# Patient Record
Sex: FEMALE | Race: WHITE | NOT HISPANIC OR LATINO | Employment: OTHER | URBAN - METROPOLITAN AREA
[De-identification: names, ages, dates, MRNs, and addresses within clinical notes are randomized per-mention and may not be internally consistent; named-entity substitution may affect disease eponyms.]

---

## 2017-01-04 ENCOUNTER — OFFICE VISIT (OUTPATIENT)
Dept: MEDICAL GROUP | Facility: MEDICAL CENTER | Age: 62
End: 2017-01-04
Payer: MEDICARE

## 2017-01-04 VITALS
SYSTOLIC BLOOD PRESSURE: 102 MMHG | HEIGHT: 61 IN | WEIGHT: 100 LBS | DIASTOLIC BLOOD PRESSURE: 64 MMHG | OXYGEN SATURATION: 97 % | RESPIRATION RATE: 16 BRPM | TEMPERATURE: 98.1 F | BODY MASS INDEX: 18.88 KG/M2 | HEART RATE: 56 BPM

## 2017-01-04 DIAGNOSIS — Z12.31 VISIT FOR SCREENING MAMMOGRAM: ICD-10-CM

## 2017-01-04 DIAGNOSIS — E78.5 DYSLIPIDEMIA: ICD-10-CM

## 2017-01-04 DIAGNOSIS — E55.9 VITAMIN D DEFICIENCY: ICD-10-CM

## 2017-01-04 DIAGNOSIS — F41.9 ANXIETY: ICD-10-CM

## 2017-01-04 DIAGNOSIS — Z12.11 SCREEN FOR COLON CANCER: ICD-10-CM

## 2017-01-04 DIAGNOSIS — Z00.00 INITIAL MEDICARE ANNUAL WELLNESS VISIT: ICD-10-CM

## 2017-01-04 DIAGNOSIS — Z23 INFLUENZA VACCINE NEEDED: ICD-10-CM

## 2017-01-04 DIAGNOSIS — M19.042 PRIMARY OSTEOARTHRITIS OF BOTH HANDS: ICD-10-CM

## 2017-01-04 DIAGNOSIS — L23.9 ALLERGIC DERMATITIS: ICD-10-CM

## 2017-01-04 DIAGNOSIS — M19.041 PRIMARY OSTEOARTHRITIS OF BOTH HANDS: ICD-10-CM

## 2017-01-04 DIAGNOSIS — M54.6 THORACIC SPINE PAIN: ICD-10-CM

## 2017-01-04 PROCEDURE — 99213 OFFICE O/P EST LOW 20 MIN: CPT | Mod: 25 | Performed by: INTERNAL MEDICINE

## 2017-01-04 PROCEDURE — 90686 IIV4 VACC NO PRSV 0.5 ML IM: CPT | Performed by: INTERNAL MEDICINE

## 2017-01-04 PROCEDURE — G0438 PPPS, INITIAL VISIT: HCPCS | Mod: 25 | Performed by: INTERNAL MEDICINE

## 2017-01-04 PROCEDURE — G0008 ADMIN INFLUENZA VIRUS VAC: HCPCS | Performed by: INTERNAL MEDICINE

## 2017-01-04 RX ORDER — CLOBETASOL PROPIONATE 0.5 MG/G
OINTMENT TOPICAL
Qty: 60 G | Refills: 2 | Status: SHIPPED | OUTPATIENT
Start: 2017-01-04 | End: 2022-12-07

## 2017-01-04 ASSESSMENT — PATIENT HEALTH QUESTIONNAIRE - PHQ9: CLINICAL INTERPRETATION OF PHQ2 SCORE: 0

## 2017-01-04 NOTE — MR AVS SNAPSHOT
"Emelina Baum   2017 8:20 AM   Office Visit   MRN: [id number]    Department:  [address] [place] [address]   Dept Phone:  [phone]    Description:  Female : 1955   Provider:  Naseem Beard M.D.           Reason for Visit     Establish Care     Annual Exam     Medication Refill           Allergies as of 2017     No Known Allergies      You were diagnosed with     Initial Medicare annual wellness visit   [3899620]       Dyslipidemia   [327006]       Anxiety   [808639]       Primary osteoarthritis of both hands   [9942763]       Visit for screening mammogram   [370772]       Thoracic spine pain   [853644]       Screen for colon cancer   [044967]       Vitamin D deficiency   [6517299]       Allergic dermatitis   [958410]       Influenza vaccine needed   [679713]         Vital Signs     Blood Pressure Pulse Temperature Respirations Height Weight    102/64 mmHg 56 36.7 °C (98.1 °F) 16 1.549 m (5' 1\") 45.36 kg (100 lb)    Body Mass Index Oxygen Saturation Smoking Status             18.90 kg/m2 97% Never Smoker          Basic Information     Date Of Birth Sex Race Ethnicity Preferred Language    1955 Female Unknown Non- English      Your appointments     2017  7:40 AM   Established Patient with Naseem Beard M.D.   [facility] [address] ([facility])    [address] [address] [phone]           You will be receiving a confirmation call a few days before your appointment from our automated call confirmation system.              Problem List              ICD-10-CM Priority Class Noted - Resolved    Osteoarthritis M19.90   Unknown - Present    Anxiety F41.9   2014 - Present    Allergic dermatitis L23.9   2015 - Present    Dyslipidemia E78.5   2015 - Present    Thoracic spine pain M54.6   2015 - Present    Right wrist pain M25.531   2015 - Present      Health Maintenance        Date Due Completion Dates    IMM DTaP/Tdap/Td " Vaccine (1 - Tdap) 6/3/1974 ---    PAP SMEAR 6/3/1976 ---    MAMMOGRAM 6/3/1995 ---    IMM ZOSTER VACCINE 6/3/2015 ---    IMM INFLUENZA (1) 9/1/2016 ---    COLON CANCER SCREENING ANNUAL FIT 2/26/2017 2/26/2016, 12/20/2015            Current Immunizations     Influenza Vaccine Quad Inj (Pf)  Incomplete      Below and/or attached are the medications your provider expects you to take. Review all of your home medications and newly ordered medications with your provider and/or pharmacist. Follow medication instructions as directed by your provider and/or pharmacist. Please keep your medication list with you and share with your provider. Update the information when medications are discontinued, doses are changed, or new medications (including over-the-counter products) are added; and carry medication information at all times in the event of emergency situations     Allergies:  No Known Allergies          Medications  Valid as of: January 04, 2017 -  9:02 AM    Generic Name Brand Name Tablet Size Instructions for use    Clobetasol Propionate (Ointment) TEMOVATE 0.05 % Apply thinly twice daily, limit 2 weeks        Nitroglycerin (SL Tab) NITROSTAT 0.4 MG Place 1 Tab under tongue as needed for Chest Pain.        .                 Medicines prescribed today were sent to:     [facility] [address] [place] [id number] [facility] [place] [id number] [facility] [address]    Phone: [phone] Fax: [phone]    Open 24 Hours?: No      Medication refill instructions:       If your prescription bottle indicates you have medication refills left, it is not necessary to call your provider’s office. Please contact your pharmacy and they will refill your medication.    If your prescription bottle indicates you do not have any refills left, you may request refills at any time through one of the following ways: The online [email or web address] system (except Urgent Care), by calling your provider’s  office, or by asking your pharmacy to contact your provider’s office with a refill request. Medication refills are processed only during regular business hours and may not be available until the next business day. Your provider may request additional information or to have a follow-up visit with you prior to refilling your medication.   *Please Note: Medication refills are assigned a new Rx number when refilled electronically. Your pharmacy may indicate that no refills were authorized even though a new prescription for the same medication is available at the pharmacy. Please request the medicine by name with the pharmacy before contacting your provider for a refill.        Your To Do List     Future Labs/Procedures Complete By Expires    COMP METABOLIC PANEL  As directed 1/5/2018    LIPID PROFILE  As directed 1/5/2018    MA-SCREEN MAMMO W/CAD-BILAT  As directed 2/5/2018    OCCULT BLOOD FECES IMMUNOASSAY  As directed 1/5/2018    VITAMIN D,25 HYDROXY  As directed 1/5/2018      Referral     A referral request has been sent to our patient care coordination department. Please allow 3-5 business days for us to process this request and contact you either by phone or mail. If you do not hear from us by the 5th business day, please call us at [phone].           ScanSafe Access Code: Activation code not generated  Current ScanSafe Status: Active

## 2017-01-04 NOTE — PROGRESS NOTES
CC: Establishing care multiple issues due for wellness exam    HPI:   Emelina presents today with the following.    1. Initial Medicare annual wellness visit  Screens performed below.    2. Dyslipidemia  Cholesterol showing slightly elevated LDL with good HDL last checked one year ago. She has changed her diet and is due for repeat checks.    3. Anxiety  She does have underlying anxiety for which she's taken Valium in the past but reports currently doing well. 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    4. Primary osteoarthritis of both hands  She does have arthritis of both hands report currently tolerating well.    5. Visit for screening mammogram  Overdue for mammogram.    6. Thoracic spine pain  Major complaint is chronic back pain present for last several years. Mild spondylolisthesis on lumbar x-ray. Most her pain however is in the thoracic region. No major findings on x-rays last year. She denies any change in character first pain no loss of bowel or bladder. She does take intermittent workout last given 30 pills a year ago. She has seen a spine surgeon with the recommendations.    7. Screen for colon cancer  Overdue for screening          Depression Screening    Little interest or pleasure in doing things?  0 - not at all  Feeling down, depressed , or hopeless? 0 - not at all  Trouble falling or staying asleep, or sleeping too much?     Feeling tired or having little energy?     Poor appetite or overeating?     Feeling bad about yourself - or that you are a failure or have let yourself or your family down?    Trouble concentrating on things, such as reading the newspaper or watching television?    Moving or speaking so slowly that other people could have noticed.  Or the opposite - being so fidgety or restless that you have been moving around a lot more than usual?     Thoughts that you would be better off dead, or of hurting yourself?     Patient Health  Questionnaire Score:      If depressive symptoms identified deferred to follow up visit unless specifically addressed in assesment and plan.      Screening for Cognitive Impairment    Three Minute Recall (banana, sunrise, fence)  2/3 2/3  Draw clock face with all 12 numbers set to the hand to show 10 minures past 11 o'clock  1 4/5  Cognitive concerns identified defferred for follow up unless specifically addressed in assesment and plan.    Fall Risk Assessment    Has the patient had two or more falls in the last year or any fall with injury in the last year?  No    Safety Assessment    Throw rugs on floor.  No  Handrails on all stairs.  No  Good lighting in all hallways.  Yes  Difficulty hearing.  No  Patient counseled about all safety risks that were identified.    Functional Assessment ADLs    Are there any barriers preventing you from cooking for yourself or meeting nutritional needs?  No.    Are there any barriers preventing you from driving safely or obtaining transportation?  No.    Are there any barriers preventing you from using a telephone or calling for help?  No.    Are there any barriers preventing you from shopping?  No.    Are there any barriers preventing you from taking care of your own finances?   .    Are there any barriers preventing you from managing your medications?  No.    Are currently engaing any exercise or physical activity?  Yes.       Health Maintenance Summary                Annual Wellness Visit Overdue 1955     IMM DTaP/Tdap/Td Vaccine Overdue 6/3/1974     PAP SMEAR Overdue 6/3/1976     MAMMOGRAM Overdue 6/3/1995     IMM ZOSTER VACCINE Overdue 6/3/2015     IMM INFLUENZA Overdue 9/1/2016     COLON CANCER SCREENING ANNUAL FIT Next Due 2/26/2017      Done 2/26/2016 OCCULT BLOOD FECES IMMUNOASSAY     Patient has more history with this topic...          Patient Care Team:  Naseem Beard M.D. as PCP - General (Internal Medicine)      Patient Active Problem List    Diagnosis Date Noted  "  • Allergic dermatitis 12/17/2015   • Dyslipidemia 12/17/2015   • Thoracic spine pain 12/17/2015   • Right wrist pain 12/17/2015   • Anxiety 09/08/2014   • Osteoarthritis        Current Outpatient Prescriptions   Medication Sig Dispense Refill   • clobetasol (TEMOVATE) 0.05 % Ointment Apply thinly twice daily, limit 2 weeks 60 g 2   • nitroglycerin (NITROSTAT) 0.4 MG SL Tab Place 1 Tab under tongue as needed for Chest Pain. 25 Tab 0     No current facility-administered medications for this visit.         Allergies as of 01/04/2017   • (No Known Allergies)        ROS: As per HPI.    /64 mmHg  Pulse 56  Temp(Src) 36.7 °C (98.1 °F)  Resp 16  Ht 1.549 m (5' 1\")  Wt 45.36 kg (100 lb)  BMI 18.90 kg/m2  SpO2 97%    Physical Exam:  Gen:         Alert and oriented, No apparent distress.  Neck:        No Lymphadenopathy or Bruits.  Lungs:     Clear to auscultation bilaterally  CV:          Regular rate and rhythm. No murmurs, rubs or gallops.  Abd:         Soft non tender, non distended. Normal active bowel sounds.  No  Hepatosplenomegaly, No pulsatile masses.                   Ext:          No clubbing, cyanosis, edema.      Assessment and Plan.   61 y.o. female with the following issues.    1. Initial Medicare annual wellness visit  Discussed healthy lifestyle habits as well as screening regimens. Information given on advanced directives.  - Initial Wellness Visit - Includes PPPS ()    2. Dyslipidemia  Recheck cholesterol discussion about diet.  - Initial Wellness Visit - Includes PPPS ()  - COMP METABOLIC PANEL; Future  - LIPID PROFILE; Future    3. Anxiety  Clinically doing well no current need for therapy.  - Initial Wellness Visit - Includes PPPS ()    4. Primary osteoarthritis of both hands  Discussion about topical over-the-counter medications.  - Initial Wellness Visit - Includes PPPS ()    5. Visit for screening mammogram    - MA-SCREEN MAMMO W/CAD-BILAT; Future    6. Thoracic spine " pain  No major abnormalities on exam discussed morbid therapy and pain medications have referred to pain specialist.  - REFERRAL TO PAIN CLINIC    7. Screen for colon cancer    - OCCULT BLOOD FECES IMMUNOASSAY; Future    8. Influenza vaccine needed    - INFLUENZA VACCINE QUAD INJ >3Y(PF)

## 2017-01-05 ENCOUNTER — HOSPITAL ENCOUNTER (OUTPATIENT)
Facility: MEDICAL CENTER | Age: 62
End: 2017-01-05
Attending: INTERNAL MEDICINE
Payer: MEDICARE

## 2017-01-05 LAB
25(OH)D3+25(OH)D2 SERPL-MCNC: 6.9 NG/ML (ref 30–100)
ALBUMIN SERPL-MCNC: 4.4 G/DL (ref 3.6–4.8)
ALBUMIN/GLOB SERPL: 1.8 {RATIO} (ref 1.1–2.5)
ALP SERPL-CCNC: 99 IU/L (ref 39–117)
ALT SERPL-CCNC: 12 IU/L (ref 0–32)
AST SERPL-CCNC: 16 IU/L (ref 0–40)
BILIRUB SERPL-MCNC: 0.4 MG/DL (ref 0–1.2)
BUN SERPL-MCNC: 13 MG/DL (ref 8–27)
BUN/CREAT SERPL: 22 (ref 11–26)
CALCIUM SERPL-MCNC: 9.3 MG/DL (ref 8.7–10.3)
CHLORIDE SERPL-SCNC: 102 MMOL/L (ref 96–106)
CHOLEST SERPL-MCNC: 303 MG/DL (ref 100–199)
CO2 SERPL-SCNC: 23 MMOL/L (ref 18–29)
COMMENT 011824: ABNORMAL
CREAT SERPL-MCNC: 0.6 MG/DL (ref 0.57–1)
GLOBULIN SER CALC-MCNC: 2.5 G/DL (ref 1.5–4.5)
GLUCOSE SERPL-MCNC: 85 MG/DL (ref 65–99)
HDLC SERPL-MCNC: 61 MG/DL
LDLC SERPL CALC-MCNC: 219 MG/DL (ref 0–99)
POTASSIUM SERPL-SCNC: 4 MMOL/L (ref 3.5–5.2)
PROT SERPL-MCNC: 6.9 G/DL (ref 6–8.5)
SODIUM SERPL-SCNC: 140 MMOL/L (ref 134–144)
TRIGL SERPL-MCNC: 117 MG/DL (ref 0–149)
VLDLC SERPL CALC-MCNC: 23 MG/DL (ref 5–40)

## 2017-01-05 PROCEDURE — 82274 ASSAY TEST FOR BLOOD FECAL: CPT

## 2017-01-10 DIAGNOSIS — Z12.11 SCREEN FOR COLON CANCER: ICD-10-CM

## 2017-01-11 LAB — HEMOCCULT STL QL IA: NEGATIVE

## 2017-04-05 ENCOUNTER — APPOINTMENT (OUTPATIENT)
Dept: MEDICAL GROUP | Facility: MEDICAL CENTER | Age: 62
End: 2017-04-05
Payer: MEDICARE

## 2021-03-03 DIAGNOSIS — Z23 NEED FOR VACCINATION: ICD-10-CM

## 2022-12-04 ENCOUNTER — OFFICE VISIT (OUTPATIENT)
Dept: URGENT CARE | Facility: CLINIC | Age: 67
End: 2022-12-04
Payer: MEDICARE

## 2022-12-04 VITALS
TEMPERATURE: 99 F | SYSTOLIC BLOOD PRESSURE: 132 MMHG | DIASTOLIC BLOOD PRESSURE: 84 MMHG | OXYGEN SATURATION: 97 % | WEIGHT: 80 LBS | HEART RATE: 100 BPM | BODY MASS INDEX: 14.72 KG/M2 | HEIGHT: 62 IN | RESPIRATION RATE: 20 BRPM

## 2022-12-04 DIAGNOSIS — M54.42 BILATERAL LOW BACK PAIN WITH BILATERAL SCIATICA, UNSPECIFIED CHRONICITY: ICD-10-CM

## 2022-12-04 DIAGNOSIS — M54.41 BILATERAL LOW BACK PAIN WITH BILATERAL SCIATICA, UNSPECIFIED CHRONICITY: ICD-10-CM

## 2022-12-04 PROCEDURE — 99204 OFFICE O/P NEW MOD 45 MIN: CPT

## 2022-12-04 RX ORDER — METHYLPREDNISOLONE 4 MG/1
TABLET ORAL
Qty: 21 TABLET | Refills: 0 | Status: SHIPPED | OUTPATIENT
Start: 2022-12-04

## 2022-12-04 RX ORDER — ASPIRIN 325 MG
325 TABLET ORAL EVERY 6 HOURS PRN
COMMUNITY

## 2022-12-04 RX ORDER — CYCLOBENZAPRINE HCL 5 MG
5 TABLET ORAL 3 TIMES DAILY PRN
Qty: 15 TABLET | Refills: 0 | Status: SHIPPED | OUTPATIENT
Start: 2022-12-04 | End: 2022-12-07

## 2022-12-04 RX ORDER — KETOROLAC TROMETHAMINE 30 MG/ML
15 INJECTION, SOLUTION INTRAMUSCULAR; INTRAVENOUS ONCE
Status: COMPLETED | OUTPATIENT
Start: 2022-12-04 | End: 2022-12-04

## 2022-12-04 RX ADMIN — KETOROLAC TROMETHAMINE 15 MG: 30 INJECTION, SOLUTION INTRAMUSCULAR; INTRAVENOUS at 11:55

## 2022-12-04 ASSESSMENT — PAIN SCALES - GENERAL: PAINLEVEL: 9=SEVERE PAIN

## 2022-12-04 NOTE — PROGRESS NOTES
Subjective:   Emelina Baum is a 67 y.o. female who presents for Other (#4 lumbar fracture:old injury, 2 days ago had high level of pain shooting down to Rt side of ankle, pinching of nerve as she's sitting/laying on bed, pain subsided as she was laying in the tub, pt states that Vicodin has helped in the past)      HPI: This is a 67-year-old female who presents today for back pain.  This is an acute on chronic problem.  Patient reports old injury 30 years ago to lower lumbar.  Patient reports history of fractures to her lower lumbar.  She reports experiencing 10\10 pain to low back with shooting pain down both legs.  She reports pain is worsened with sitting up and moving.  She has taken aspirin which was has been somewhat helpful.  She denies recent injury or trauma to her back.  She is requesting Vicodin today as this has been helpful in the past.  Patient reports that she was seen by pain management 1 year ago, but has not had to follow-up with them over the last year as her pain was controlled.  No saddle anesthesia, no loss of control of bowel and bladder.      Review of Systems   Musculoskeletal:  Positive for back pain.   Neurological:  Negative for tingling, sensory change and weakness.     Medications:    Current Outpatient Medications on File Prior to Visit   Medication Sig Dispense Refill    aspirin (ASA) 325 MG Tab Take 325 mg by mouth every 6 hours as needed.      clobetasol (TEMOVATE) 0.05 % Ointment Apply thinly twice daily, limit 2 weeks 60 g 2    nitroglycerin (NITROSTAT) 0.4 MG SL Tab Place 1 Tab under tongue as needed for Chest Pain. 25 Tab 0     No current facility-administered medications on file prior to visit.        Allergies:   Patient has no known allergies.    Problem List:   Patient Active Problem List   Diagnosis    Osteoarthritis    Anxiety    Allergic dermatitis    Dyslipidemia    Thoracic spine pain    Right wrist pain        Surgical History:  Past Surgical History:   Procedure  "Laterality Date    APPENDECTOMY  age 14    ruptured    TONSILLECTOMY         Past Social Hx:   Social History     Tobacco Use    Smoking status: Never    Smokeless tobacco: Never   Vaping Use    Vaping Use: Never used   Substance Use Topics    Alcohol use: No     Alcohol/week: 0.0 oz    Drug use: No          Problem list, medications, and allergies reviewed by myself today in Epic.     Objective:     /84 (BP Location: Left arm, Patient Position: Supine, BP Cuff Size: Adult)   Pulse 100   Temp 37.2 °C (99 °F) (Temporal)   Resp 20   Ht 1.575 m (5' 2\")   Wt 36.3 kg (80 lb)   SpO2 97%   BMI 14.63 kg/m²     Physical Exam  Vitals and nursing note reviewed.   Constitutional:       General: She is not in acute distress.     Appearance: Normal appearance. She is normal weight. She is not ill-appearing, toxic-appearing or diaphoretic.   HENT:      Head: Normocephalic and atraumatic.   Cardiovascular:      Rate and Rhythm: Normal rate and regular rhythm.      Pulses: Normal pulses.      Heart sounds: Normal heart sounds. No murmur heard.    No friction rub. No gallop.   Pulmonary:      Effort: Pulmonary effort is normal. No respiratory distress.      Breath sounds: Normal breath sounds. No stridor. No wheezing, rhonchi or rales.   Chest:      Chest wall: No tenderness.   Musculoskeletal:      Cervical back: Neck supple. No tenderness.        Back:       Comments: No obvious deformity, erythema, step-off to low back.  Patient has good range of motion. 5/5 strength.  Pain elicited while in sitting position   Lymphadenopathy:      Cervical: No cervical adenopathy.   Skin:     General: Skin is warm and dry.      Capillary Refill: Capillary refill takes less than 2 seconds.   Neurological:      General: No focal deficit present.      Mental Status: She is alert and oriented to person, place, and time. Mental status is at baseline.      Cranial Nerves: No cranial nerve deficit.      Motor: No weakness.      Gait: Gait " normal.   Psychiatric:         Mood and Affect: Mood normal.         Behavior: Behavior normal.         Thought Content: Thought content normal.         Judgment: Judgment normal.       Assessment/Plan:     Diagnosis and associated orders:        1. Bilateral low back pain with bilateral sciatica, unspecified chronicity  ketorolac (TORADOL) injection 15 mg    methylPREDNISolone (MEDROL DOSEPAK) 4 MG Tablet Therapy Pack    cyclobenzaprine (FLEXERIL) 5 mg tablet          Comments/MDM:   Pt is clinically stable at today's acute urgent care visit.  No acute distress noted. Appropriate for outpatient management at this time.     Acute on chronic problem.  Patient has documented history of lumbar vertebral fracture.  I have personally reviewed encounter notes from 12\17\2015 and 1\4\2017.  Patient will be given Toradol injection 15 mg here in clinic, placed on Medrol Dosepak, and given muscle relaxant.  Advised patient to use hot high caution with muscle relaxant secondary to sedative effects.  Advised her to rest, heat application, and follow-up with PCP on Monday.  Patient is agreeable with plan of care verbalizes good understanding today.           Discussed DDx, management options (risks,benefits, and alternatives to planned treatment), natural progression and supportive care.  Expressed understanding and the treatment plan was agreed upon. Questions were encouraged and answered   Return to urgent care prn if new or worsening sx or if there is no improvement in condition prn.    Educated in Red flags and indications to immediately call 911 or present to the Emergency Department.   Advised the patient to follow-up with the primary care physician for recheck, reevaluation, and consideration of further management.    I personally reviewed prior external notes and test results pertinent to today's visit.  I have independently reviewed and interpreted all diagnostics ordered during this urgent care acute visit.        Please note that this dictation was created using voice recognition software. I have made a reasonable attempt to correct obvious errors, but I expect that there are errors of grammar and possibly content that I did not discover before finalizing the note.    This note was electronically signed by CHEL Portillo

## 2022-12-05 ASSESSMENT — ENCOUNTER SYMPTOMS
WEAKNESS: 0
BACK PAIN: 1
TINGLING: 0
SENSORY CHANGE: 0

## 2022-12-07 ENCOUNTER — HOSPITAL ENCOUNTER (EMERGENCY)
Facility: MEDICAL CENTER | Age: 67
End: 2022-12-07
Attending: STUDENT IN AN ORGANIZED HEALTH CARE EDUCATION/TRAINING PROGRAM
Payer: MEDICARE

## 2022-12-07 ENCOUNTER — APPOINTMENT (OUTPATIENT)
Dept: RADIOLOGY | Facility: MEDICAL CENTER | Age: 67
End: 2022-12-07
Attending: STUDENT IN AN ORGANIZED HEALTH CARE EDUCATION/TRAINING PROGRAM
Payer: MEDICARE

## 2022-12-07 VITALS
SYSTOLIC BLOOD PRESSURE: 121 MMHG | BODY MASS INDEX: 15.06 KG/M2 | HEIGHT: 63 IN | TEMPERATURE: 98 F | DIASTOLIC BLOOD PRESSURE: 91 MMHG | RESPIRATION RATE: 18 BRPM | HEART RATE: 97 BPM | WEIGHT: 85 LBS | OXYGEN SATURATION: 97 %

## 2022-12-07 DIAGNOSIS — Z79.899 POLYPHARMACY: ICD-10-CM

## 2022-12-07 LAB
ALBUMIN SERPL BCP-MCNC: 5 G/DL (ref 3.2–4.9)
ALBUMIN/GLOB SERPL: 1.6 G/DL
ALP SERPL-CCNC: 95 U/L (ref 30–99)
ALT SERPL-CCNC: 14 U/L (ref 2–50)
AMPHET UR QL SCN: NEGATIVE
ANION GAP SERPL CALC-SCNC: 19 MMOL/L (ref 7–16)
APPEARANCE UR: ABNORMAL
AST SERPL-CCNC: 23 U/L (ref 12–45)
BACTERIA #/AREA URNS HPF: ABNORMAL /HPF
BARBITURATES UR QL SCN: NEGATIVE
BASOPHILS # BLD AUTO: 0 % (ref 0–1.8)
BASOPHILS # BLD: 0 K/UL (ref 0–0.12)
BENZODIAZ UR QL SCN: NEGATIVE
BILIRUB SERPL-MCNC: 0.9 MG/DL (ref 0.1–1.5)
BILIRUB UR QL STRIP.AUTO: ABNORMAL
BUN SERPL-MCNC: 39 MG/DL (ref 8–22)
BZE UR QL SCN: NEGATIVE
CALCIUM SERPL-MCNC: 10.2 MG/DL (ref 8.4–10.2)
CANNABINOIDS UR QL SCN: NEGATIVE
CHLORIDE SERPL-SCNC: 98 MMOL/L (ref 96–112)
CO2 SERPL-SCNC: 18 MMOL/L (ref 20–33)
COLOR UR: YELLOW
CREAT SERPL-MCNC: 0.7 MG/DL (ref 0.5–1.4)
EOSINOPHIL # BLD AUTO: 0 K/UL (ref 0–0.51)
EOSINOPHIL NFR BLD: 0 % (ref 0–6.9)
EPI CELLS #/AREA URNS HPF: ABNORMAL /HPF
ERYTHROCYTE [DISTWIDTH] IN BLOOD BY AUTOMATED COUNT: 42 FL (ref 35.9–50)
ETHANOL BLD-MCNC: <10.1 MG/DL
GFR SERPLBLD CREATININE-BSD FMLA CKD-EPI: 94 ML/MIN/1.73 M 2
GLOBULIN SER CALC-MCNC: 3.2 G/DL (ref 1.9–3.5)
GLUCOSE SERPL-MCNC: 120 MG/DL (ref 65–99)
GLUCOSE UR STRIP.AUTO-MCNC: NEGATIVE MG/DL
HCT VFR BLD AUTO: 44.7 % (ref 37–47)
HGB BLD-MCNC: 15.3 G/DL (ref 12–16)
KETONES UR STRIP.AUTO-MCNC: 40 MG/DL
LEUKOCYTE ESTERASE UR QL STRIP.AUTO: NEGATIVE
LYMPHOCYTES # BLD AUTO: 2.36 K/UL (ref 1–4.8)
LYMPHOCYTES NFR BLD: 20 % (ref 22–41)
MANUAL DIFF BLD: NORMAL
MCH RBC QN AUTO: 30.8 PG (ref 27–33)
MCHC RBC AUTO-ENTMCNC: 34.2 G/DL (ref 33.6–35)
MCV RBC AUTO: 90.1 FL (ref 81.4–97.8)
METHADONE UR QL SCN: NEGATIVE
MICRO URNS: ABNORMAL
MONOCYTES # BLD AUTO: 0.71 K/UL (ref 0–0.85)
MONOCYTES NFR BLD AUTO: 6 % (ref 0–13.4)
NEUTROPHILS # BLD AUTO: 8.73 K/UL (ref 2–7.15)
NEUTROPHILS NFR BLD: 67 % (ref 44–72)
NEUTS BAND NFR BLD MANUAL: 7 % (ref 0–10)
NITRITE UR QL STRIP.AUTO: NEGATIVE
NRBC # BLD AUTO: 0 K/UL
NRBC BLD-RTO: 0 /100 WBC
OPIATES UR QL SCN: NEGATIVE
OXYCODONE UR QL SCN: NEGATIVE
PCP UR QL SCN: NEGATIVE
PH UR STRIP.AUTO: 6 [PH] (ref 5–8)
PLATELET # BLD AUTO: 236 K/UL (ref 164–446)
PLATELET BLD QL SMEAR: NORMAL
PMV BLD AUTO: 10.5 FL (ref 9–12.9)
POTASSIUM SERPL-SCNC: 3.5 MMOL/L (ref 3.6–5.5)
PROPOXYPH UR QL SCN: NEGATIVE
PROT SERPL-MCNC: 8.2 G/DL (ref 6–8.2)
PROT UR QL STRIP: 30 MG/DL
RBC # BLD AUTO: 4.96 M/UL (ref 4.2–5.4)
RBC # URNS HPF: ABNORMAL /HPF
RBC BLD AUTO: NORMAL
RBC UR QL AUTO: NEGATIVE
SODIUM SERPL-SCNC: 135 MMOL/L (ref 135–145)
SP GR UR STRIP.AUTO: 1.02
WBC # BLD AUTO: 11.8 K/UL (ref 4.8–10.8)
WBC #/AREA URNS HPF: ABNORMAL /HPF

## 2022-12-07 PROCEDURE — 82077 ASSAY SPEC XCP UR&BREATH IA: CPT

## 2022-12-07 PROCEDURE — 81001 URINALYSIS AUTO W/SCOPE: CPT | Mod: XU

## 2022-12-07 PROCEDURE — 36415 COLL VENOUS BLD VENIPUNCTURE: CPT

## 2022-12-07 PROCEDURE — 80053 COMPREHEN METABOLIC PANEL: CPT

## 2022-12-07 PROCEDURE — 99285 EMERGENCY DEPT VISIT HI MDM: CPT | Mod: 25

## 2022-12-07 PROCEDURE — 85025 COMPLETE CBC W/AUTO DIFF WBC: CPT

## 2022-12-07 PROCEDURE — 85007 BL SMEAR W/DIFF WBC COUNT: CPT | Mod: XU

## 2022-12-07 PROCEDURE — 70450 CT HEAD/BRAIN W/O DYE: CPT

## 2022-12-07 PROCEDURE — 80307 DRUG TEST PRSMV CHEM ANLYZR: CPT

## 2022-12-07 RX ORDER — CYCLOBENZAPRINE HCL 10 MG
10 TABLET ORAL 3 TIMES DAILY PRN
COMMUNITY

## 2022-12-07 RX ORDER — CYCLOBENZAPRINE HCL 5 MG
5 TABLET ORAL 3 TIMES DAILY PRN
COMMUNITY

## 2022-12-07 ASSESSMENT — LIFESTYLE VARIABLES
HAVE YOU EVER FELT YOU SHOULD CUT DOWN ON YOUR DRINKING: NO
CONSUMPTION TOTAL: INCOMPLETE
EVER HAD A DRINK FIRST THING IN THE MORNING TO STEADY YOUR NERVES TO GET RID OF A HANGOVER: NO
TOTAL SCORE: 0
HAVE PEOPLE ANNOYED YOU BY CRITICIZING YOUR DRINKING: NO
DO YOU DRINK ALCOHOL: NO
EVER FELT BAD OR GUILTY ABOUT YOUR DRINKING: NO

## 2022-12-07 NOTE — DISCHARGE INSTRUCTIONS
Do NOT take any more of the cyclobenzaprine (Flexeril) and do NOT take Ativan (lorazepam) as we believe this may have caused some of your symptoms and your confusion as often it is not safe in patients of your age.    Do not drive if you are taking any agents including alcohol or other drugs that might impair you.

## 2022-12-07 NOTE — DISCHARGE PLANNING
note:  Dr. Antonio has cleared pt for discharge. Possible drug induced confusion. Pt is pleasant and cooperative. She will call her won taxi. All belongings returned including credit card and drivers lisence and return by RN Manager.     Pt said she will call Lisa and give her an update.

## 2022-12-07 NOTE — ED NOTES
Pt sitting in chair in room. Nonlinear conversational pattern, requires frequent redirection. Reminded of plan to see SW. No needs at this time.
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
## 2022-12-07 NOTE — ED NOTES
Report from SHEEBA Rawls  Assumed pt care at this time  Security is within direct view of pt, pt is sitting on edge of gurney, does not appear in any distress at this time
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
## 2022-12-07 NOTE — ED NOTES
Pt attempting to leave dept. Security and ER manager out to front lobby to persuade pt to stay due to concerns regarding pt's safety.

## 2022-12-07 NOTE — DISCHARGE PLANNING
SW provided  with list of [facility] information.  Oscar Baum [phone]  Barak Baum [phone]  Lizbet Navarrete [phone]
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
## 2022-12-07 NOTE — ED NOTES
Approx 0915: Pt attempting to leave ED, followed by myself and CM. Pt is confused, cannot answer questions related to home address or situation. Security called to try to bring patient back to the room however they need a legal hold.I went back to speak with ERP about pt and found a legal hold on chart from the [facility] correction who released the pt to ER for medical clearance for inability to care for self. ERP comes outside in front of the ED lobby to speak to patient for approx 30 minutes to try and find a family member or friend who can care for pt; unable to reach anybody. Pt is fully dressed and has her purse and jacket. Convinced around 1030 to come inside where it is warm until we are able to find family/friend to take pt home, security remains at bedside. Offered food/fluids, declined. ERP has left a message for pt PCP to call back to ask about baseline mentation. Around 1055 pt reached a friend by phone in [place], states she doesn't think pt has a local friend/family. ERP states legal hold will remain in place at this time. Security at bedside and pt in room 16. Pt belongings and clothing left with pt at this time per ERP as removing may create more issues at this time.
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
## 2022-12-07 NOTE — ED NOTES
Pt denies any complaints.  She is attempting to find a ride home w/o success.  MD aware and plan for SW consult at this time.

## 2022-12-07 NOTE — ED NOTES
Pt attempting to leave dept, ERP made aware. Pt continues to engage in nonlinear conversation, unable to tell RN home address, difficulty putting passcode into cell phone.

## 2022-12-07 NOTE — ED NOTES
ERP spoke with pt friend in [place] and updated on condition and need for family/friend to come get pt. Friend states she may consider flying here but needs some time. ERP gives order to allow pt to keep belongings with her and clothing. Charge RN aware of need to move and a sitter for pt.

## 2022-12-07 NOTE — ED NOTES
RN to bedside to introduce self to patient. Pt updated on plan to see SW. Pt reluctant to stay, but agreeing. Denies needs or pain at this time.

## 2022-12-08 NOTE — DISCHARGE PLANNING
Anticipated Discharge Disposition: Home    Action: Call from Sister in [place] Maria Dolores Miles . She was aware pt was to be released but expected it to be today. Advised she is gone and should be at home. Maria Dolores will follow up with her at home. Sister does have contact info.    Barriers to Discharge: None    Plan: No further needs at this time.

## 2022-12-21 ENCOUNTER — HOSPITAL ENCOUNTER (OUTPATIENT)
Dept: LAB | Facility: MEDICAL CENTER | Age: 67
End: 2022-12-21
Attending: FAMILY MEDICINE
Payer: MEDICARE

## 2022-12-21 LAB
ALBUMIN SERPL BCP-MCNC: 4.2 G/DL (ref 3.2–4.9)
ALBUMIN/GLOB SERPL: 1.7 G/DL
ALP SERPL-CCNC: 67 U/L (ref 30–99)
ALT SERPL-CCNC: 18 U/L (ref 2–50)
ANION GAP SERPL CALC-SCNC: 13 MMOL/L (ref 7–16)
AST SERPL-CCNC: 21 U/L (ref 12–45)
BILIRUB SERPL-MCNC: 0.5 MG/DL (ref 0.1–1.5)
BUN SERPL-MCNC: 11 MG/DL (ref 8–22)
CALCIUM ALBUM COR SERPL-MCNC: 8.9 MG/DL (ref 8.5–10.5)
CALCIUM SERPL-MCNC: 9.1 MG/DL (ref 8.5–10.5)
CHLORIDE SERPL-SCNC: 108 MMOL/L (ref 96–112)
CHOLEST SERPL-MCNC: 247 MG/DL (ref 100–199)
CO2 SERPL-SCNC: 22 MMOL/L (ref 20–33)
CREAT SERPL-MCNC: 0.57 MG/DL (ref 0.5–1.4)
FASTING STATUS PATIENT QL REPORTED: NORMAL
GFR SERPLBLD CREATININE-BSD FMLA CKD-EPI: 99 ML/MIN/1.73 M 2
GLOBULIN SER CALC-MCNC: 2.5 G/DL (ref 1.9–3.5)
GLUCOSE SERPL-MCNC: 101 MG/DL (ref 65–99)
HDLC SERPL-MCNC: 57 MG/DL
LDLC SERPL CALC-MCNC: 171 MG/DL
POTASSIUM SERPL-SCNC: 3.7 MMOL/L (ref 3.6–5.5)
PROT SERPL-MCNC: 6.7 G/DL (ref 6–8.2)
SODIUM SERPL-SCNC: 143 MMOL/L (ref 135–145)
TRIGL SERPL-MCNC: 97 MG/DL (ref 0–149)

## 2022-12-21 PROCEDURE — 36415 COLL VENOUS BLD VENIPUNCTURE: CPT

## 2022-12-21 PROCEDURE — 84443 ASSAY THYROID STIM HORMONE: CPT

## 2022-12-21 PROCEDURE — 80061 LIPID PANEL: CPT

## 2022-12-21 PROCEDURE — 80053 COMPREHEN METABOLIC PANEL: CPT

## 2022-12-22 LAB — TSH SERPL DL<=0.005 MIU/L-ACNC: 0.84 UIU/ML (ref 0.38–5.33)
